# Patient Record
(demographics unavailable — no encounter records)

---

## 2025-03-11 NOTE — HISTORY OF PRESENT ILLNESS
[FreeTextEntry8] : - was complaining of dizziness 2 days ago, room was spinning  - went to  ER today in the morning - EKG, CT scan, labs are WNL  - diagnosed with vertigo - given Meclizine  - dizziness is better now  - under a lot of stress

## 2025-03-11 NOTE — PLAN
[FreeTextEntry1] : - meclizine as needed for symptom relief, discussed potential side effects with patient